# Patient Record
Sex: MALE | Race: BLACK OR AFRICAN AMERICAN | Employment: UNEMPLOYED | URBAN - METROPOLITAN AREA
[De-identification: names, ages, dates, MRNs, and addresses within clinical notes are randomized per-mention and may not be internally consistent; named-entity substitution may affect disease eponyms.]

---

## 2022-03-30 ENCOUNTER — HOSPITAL ENCOUNTER (EMERGENCY)
Age: 42
Discharge: HOME | End: 2022-03-30
Attending: SPECIALIST

## 2022-03-30 VITALS
HEART RATE: 107 BPM | SYSTOLIC BLOOD PRESSURE: 167 MMHG | RESPIRATION RATE: 18 BRPM | DIASTOLIC BLOOD PRESSURE: 89 MMHG | OXYGEN SATURATION: 99 %

## 2022-03-30 DIAGNOSIS — J45.21 MILD INTERMITTENT ASTHMA WITH EXACERBATION: Primary | ICD-10-CM

## 2022-03-30 DIAGNOSIS — R05.9 COUGH: ICD-10-CM

## 2022-03-30 PROCEDURE — 99284 EMERGENCY DEPT VISIT MOD MDM: CPT

## 2022-03-30 RX ORDER — PREDNISONE 50 MG/1
50 TABLET ORAL DAILY
Qty: 5 TABLET | Refills: 0 | Status: SHIPPED | OUTPATIENT
Start: 2022-03-30 | End: 2022-04-04

## 2022-03-30 RX ORDER — HYDROCODONE POLISTIREX AND CHLORPHENIRAMINE POLISTIREX 10; 8 MG/5ML; MG/5ML
5 SUSPENSION, EXTENDED RELEASE ORAL EVERY 12 HOURS PRN
Qty: 180 ML | Refills: 0 | Status: SHIPPED | OUTPATIENT
Start: 2022-03-30 | End: 2022-04-17

## 2022-03-31 NOTE — ED PROVIDER NOTES
[address]      Pt Name: Ashley Olson  MRN: [id number]  [place] 1980  Date of evaluation: 3/30/22      CHIEF COMPLAINT       Chief Complaint   Patient presents with    Cough     patient states he has had a cough and SOB for x3 days, states he has breathing treatments at home and they [facility] well\", states he was hoping to get a steroid          HISTORY OF PRESENT ILLNESS    Ashley Olson is a 39 y.o. male who presents to the emergency department for evaluation of cough with yellow-colored sputum since 3 to 4 days prior to arrival, worsening since last night associated with occasional shortness of breath and wheezing. Patient has been using his DuoNeb aerosol treatment with the relief of the symptoms transiently. 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 Patient is from [place] and attributes symptoms to the weather change. He has had similar exacerbation of asthma and chronic bronchitis in the past and usually requires short course of prednisone for 5 days as well as cough medicine in addition to aerosol treatments. He denies any chest pain, abdominal pain, vomiting or diarrhea and no history of fever or chills. Patient is a prior smoker and has quit smoking 13 years ago. He used to smoke 1 pack of cigars daily for 15 years prior to that. He denies any headache, neck pain or stiffness and denies any skin rash. REVIEW OF SYSTEMS       Review of Systems    All systems reviewed and negative unless noted in HPI. The patient denies fever or constitutional symptoms. Denies any sore throat or rhinorrhea. Denies any neck pain or stiffness. Presents for evaluation of cough and occasional shortness of breath. No nausea,  vomiting or diarrhea. Denies any dysuria. Denies urinary frequency or hematuria.     Denies musculoskeletal injury or pain.   Denies any weakness, numbness or focal neurologic deficit. No easy bruising or bleeding. Denies any polyuria, polydypsia       PAST MEDICAL HISTORY    has a past medical history of Arthritis. SURGICAL HISTORY      has no past surgical history on file. CURRENT MEDICATIONS       Discharge Medication List as of 3/30/2022  8:43 PM          ALLERGIES     has No Known Allergies. FAMILY HISTORY     has no family status information on file. family history is not on file. SOCIAL HISTORY      reports that he quit smoking about 13 years ago. His smoking use included cigarettes. He has never used smokeless tobacco. He reports current alcohol use. He reports that he does not use drugs. PHYSICAL EXAM     INITIAL VITALS:  blood pressure is 167/89 (abnormal) and his pulse is 107. His respiration is 18 and oxygen saturation is 99%. Physical Exam  Vitals and nursing note reviewed. Constitutional:       Appearance: He is well-developed. HENT:      Head: Normocephalic and atraumatic. Nose: Nose normal.   Eyes:      Extraocular Movements: Extraocular movements intact. Pupils: Pupils are equal, round, and reactive to light. Cardiovascular:      Rate and Rhythm: Normal rate and regular rhythm. Heart sounds: Normal heart sounds. No murmur heard. Pulmonary:      Effort: Pulmonary effort is normal. No respiratory distress. Breath sounds: Decreased breath sounds and wheezing present. Abdominal:      General: Bowel sounds are normal. There is no distension. Palpations: Abdomen is soft. Tenderness: There is no abdominal tenderness. Musculoskeletal:      Cervical back: Normal range of motion and neck supple. Skin:     General: Skin is warm and dry. Neurological:      General: No focal deficit present. Mental Status: He is alert and oriented to person, place, and time.            DIFFERENTIAL DIAGNOSIS/ MDM:     Acute exacerbation of bronchial asthma, viral URI with cough, pneumonia, pneumothorax, pneumomediastinum    DIAGNOSTIC RESULTS     EKG: All EKG's are interpreted by the Emergency Department Physician who either signs or Co-signs this chart in the absence of a cardiologist.    None obtained    RADIOLOGY:   Interpretation per the Radiologist below, if available at the time of this note:    No results found. LABS:  No results found for this visit on 03/30/22. EMERGENCY DEPARTMENT COURSE:   Vitals:    Vitals:    03/30/22 2015   BP: (!) 167/89   Pulse: 107   Resp: 18   SpO2: 99%     -------------------------  BP: (!) 167/89, Temp:  (refused), Pulse: 107, Resp: 18    Orders Placed This Encounter   Medications    predniSONE (DELTASONE) 50 MG tablet     Sig: Take 1 tablet by mouth daily for 5 days     Dispense:  5 tablet     Refill:  0    HYDROcodone-chlorpheniramine (TUSSIONEX) 10-8 MG/5ML SUER     Sig: Take 5 mLs by mouth every 12 hours as needed (Cough) for up to 18 days. Dispense:  180 mL     Refill:  0         During the emergency department course, patient is resting comfortably and is able to speak full sentences. He does not appear to have any respiratory distress. He declined chest x-ray, aerosol treatment as well as declines first dose of prednisone. He requested prescriptions. He was prescribed prednisone for 5 days course and Tussionex cough syrup. He is advised to continue DuoNeb aerosol treatments which he has at home with aerosol machine. He is advised to follow-up with PCP, return if worse. CONSULTS:  None    PROCEDURES:  None    FINAL IMPRESSION      1. Mild intermittent asthma with exacerbation    2. Cough          DISPOSITION/PLAN       PATIENT REFERRED TO:  Call 419-same-day for follow up    Call in 2 days  For reevaluation of current symptoms    [facility] ED  [address]   [address] [place] [address]  [phone]    If symptoms worsen      DISCHARGE MEDICATIONS:  Discharge Medication List as of 3/30/2022 8:43 PM      START taking these medications    Details   predniSONE (DELTASONE) 50 MG tablet Take 1 tablet by mouth daily for 5 days, Disp-5 tablet, R-0Print      HYDROcodone-chlorpheniramine (TUSSIONEX) 10-8 MG/5ML SUER Take 5 mLs by mouth every 12 hours as needed (Cough) for up to 18 days. , Disp-180 mL, R-0Print             (Please note that portions of this note were completed with a voice recognition program.  Efforts were made to edit the dictations but occasionally words are mis-transcribed.)    Terence Ramírez MD,, MD, F.A.C.E.P.   Attending Emergency Medicine Physician     Terence Ramírez MD  03/31/22 1092